# Patient Record
Sex: MALE | Race: WHITE | Employment: FULL TIME | URBAN - METROPOLITAN AREA
[De-identification: names, ages, dates, MRNs, and addresses within clinical notes are randomized per-mention and may not be internally consistent; named-entity substitution may affect disease eponyms.]

---

## 2017-02-15 ENCOUNTER — ALLIED HEALTH/NURSE VISIT (OUTPATIENT)
Dept: FAMILY MEDICINE | Facility: CLINIC | Age: 32
End: 2017-02-15

## 2017-02-15 DIAGNOSIS — Z23 NEED FOR VACCINATION: Primary | ICD-10-CM

## 2017-02-15 PROCEDURE — 90471 IMMUNIZATION ADMIN: CPT | Performed by: FAMILY MEDICINE

## 2017-02-15 PROCEDURE — 90715 TDAP VACCINE 7 YRS/> IM: CPT | Performed by: FAMILY MEDICINE

## 2017-02-15 NOTE — MR AVS SNAPSHOT
"              After Visit Summary   2/15/2017    Hung Bennett    MRN: [id number]           Patient Information     Date Of Birth          1985        Visit Information        Provider Department      2/15/2017 3:30 PM Mihai Gonsalez MD [facility]        Today's Diagnoses     Need for vaccination    -  1       Follow-ups after your visit        Who to contact     If you have questions or need follow up information about today's clinic visit or your schedule please contact [facility] directly at [phone].  Normal or non-critical lab and imaging results will be communicated to you by pickrsethart, letter or phone within 4 business days after the clinic has received the results. If you do not hear from us within 7 days, please contact the clinic through pickrsethart or phone. If you have a critical or abnormal lab result, we will notify you by phone as soon as possible.  Submit refill requests through Sensipass or call your pharmacy and they will forward the refill request to us. Please allow 3 business days for your refill to be completed.          Additional Information About Your Visit        MyChart Information     Sensipass lets you send messages to your doctor, view your test results, renew your prescriptions, schedule appointments and more. To sign up, go to [email or web address] . Click on \"Log in\" on the left side of the screen, which will take you to the Welcome page. Then click on \"Sign up Now\" on the right side of the page.     You will be asked to enter the access code listed below, as well as some personal information. Please follow the directions to create your username and password.     Your access code is: [email or web address]  Expires: 2017  3:57 PM     Your access code will  in 90 days. If you need help or a new code, please call your [place] clinic or [phone].        Care EveryWhere ID     This is your Care EveryWhere ID. This could " be used by other organizations to access your [place] medical records  [id number]         Blood Pressure from Last 3 Encounters:   02/04/15 108/70   12/23/14 108/70   11/08/02 90/60    Weight from Last 3 Encounters:   02/04/15 72.1 kg (159 lb)   12/23/14 72.1 kg (159 lb)   11/08/02 61.5 kg (135 lb 9.6 oz) (20 %)*     * Growth percentiles are based on [facility] 2-20 Years data.              We Performed the Following     TDAP VACCINE (BOOSTRIX AGES 10-64)     VACCINE ADMINISTRATION, INITIAL        Primary Care Provider Office Phone # Fax #    Mihai Gonsalez [phone]       [facility] [address] [facility] [address]  [facility] [address]        Thank you!     Thank you for choosing [facility]  for your care. Our goal is always to provide you with excellent care. Hearing back from our patients is one way we can continue to improve our services. Please take a few minutes to complete the written survey that you may receive in the mail after your visit with us. Thank you!             Your Updated Medication List - Protect others around you: Learn how to safely use, store and throw away your medicines at www.disposemymeds.org.      Notice  As of 2/15/2017  3:57 PM    You have not been prescribed any medications.

## 2017-09-12 ENCOUNTER — TELEPHONE (OUTPATIENT)
Dept: FAMILY MEDICINE | Facility: CLINIC | Age: 32
End: 2017-09-12

## 2017-09-12 NOTE — TELEPHONE ENCOUNTER
Medical Examination Reports dropped off to be filled out by Dr. Gonsalez.    I called the patient and informed him that he would need to be seen for a Physical in order for us to fill out these forms since he has not been seen since 2014 for any sort of CPX.    He will call and schedule appointment when he has his calender.    I will hold on to forms until his visit.    Adela Devries, CMA

## 2017-09-20 ENCOUNTER — OFFICE VISIT (OUTPATIENT)
Dept: FAMILY MEDICINE | Facility: CLINIC | Age: 32
End: 2017-09-20

## 2017-09-20 VITALS
WEIGHT: 163 LBS | HEIGHT: 67 IN | SYSTOLIC BLOOD PRESSURE: 92 MMHG | DIASTOLIC BLOOD PRESSURE: 66 MMHG | BODY MASS INDEX: 25.58 KG/M2 | HEART RATE: 70 BPM | OXYGEN SATURATION: 98 % | TEMPERATURE: 98.3 F

## 2017-09-20 DIAGNOSIS — Z71.89 ACP (ADVANCE CARE PLANNING): ICD-10-CM

## 2017-09-20 DIAGNOSIS — Z00.00 ROUTINE GENERAL MEDICAL EXAMINATION AT A HEALTH CARE FACILITY: Primary | ICD-10-CM

## 2017-09-20 PROBLEM — Z76.89 HEALTH CARE HOME: Status: ACTIVE | Noted: 2017-09-20

## 2017-09-20 PROCEDURE — 99395 PREV VISIT EST AGE 18-39: CPT | Performed by: FAMILY MEDICINE

## 2017-09-20 NOTE — MR AVS SNAPSHOT
After Visit Summary   9/20/2017    Hung Bennett    MRN: [id number]           Patient Information     Date Of Birth          1985        Visit Information        Provider Department      9/20/2017 2:00 PM Mihai Gonsalez MD [facility]        Today's Diagnoses     Routine general medical examination at a health care facility    -  1    ACP (advance care planning)           Follow-ups after your visit        Follow-up notes from your care team     Return in about 1 year (around 9/20/2018).      Your next 10 appointments already scheduled     Sep 21, 2017  8:00 AM CDT   Lab Appointment with [facility]/XRAY   [facility] ([facility])    [address]  [address]  [facility] [address] [phone]              Future tests that were ordered for you today     Open Standing Orders        Priority Remaining Interval Expires Ordered    Lipid Profile (QUEST) Routine 1/1  10/20/2017 9/20/2017    COMPREHENSIVE METABOLIC PANEL ([facility]) XCMP Routine 1/1  10/20/2017 9/20/2017    VENOUS COLLECTION Routine 1/1  10/20/2017 9/20/2017            Who to contact     If you have questions or need follow up information about today's clinic visit or your schedule please contact [facility] directly at [phone].  Normal or non-critical lab and imaging results will be communicated to you by MyChart, letter or phone within 4 business days after the clinic has received the results. If you do not hear from us within 7 days, please contact the clinic through MyChart or phone. If you have a critical or abnormal lab result, we will notify you by phone as soon as possible.  Submit refill requests through Routeware or call your pharmacy and they will forward the refill request to us. Please allow 3 business days for your refill to be completed.          Additional Information About Your Visit        MyChart Information   "   Toutiao lets you send messages to your doctor, view your test results, renew your prescriptions, schedule appointments and more. To sign up, go to [email or web address] . Click on \"Log in\" on the left side of the screen, which will take you to the Welcome page. Then click on \"Sign up Now\" on the right side of the page.     You will be asked to enter the access code listed below, as well as some personal information. Please follow the directions to create your username and password.     Your access code is: [email or web address]  Expires: 2017  2:29 PM     Your access code will  in 90 days. If you need help or a new code, please call your [place] clinic or [phone].        Care EveryWhere ID     This is your Care EveryWhere ID. This could be used by other organizations to access your [place] medical records  [id number]        Your Vitals Were     Pulse Temperature Height Pulse Oximetry BMI (Body Mass Index)       70 98.3  F (36.8  C) (Oral) 1.702 m (5' 7\") 98% 25.53 kg/m2        Blood Pressure from Last 3 Encounters:   17 92/66   02/04/15 108/70   14 108/70    Weight from Last 3 Encounters:   17 73.9 kg (163 lb)   02/04/15 72.1 kg (159 lb)   14 72.1 kg (159 lb)               Primary Care Provider Office Phone # Fax #    Mihai Gonsalez [phone]       [address] [address]        Equal Access to Services     [facility]: Hadii betsy Blue, waanatolyda chaz, qaybta martina burt. So [facility] [phone].    ATENCIÓN: Si habla español, tiene a chiang disposición servicios gratuitos de asistencia lingüística. Angela al [phone].    We comply with applicable federal civil rights laws and [place] laws. We do not discriminate on the basis of race, color, national origin, age, disability sex, sexual orientation or gender identity.            Thank you!     Thank you for " choosing [facility]  for your care. Our goal is always to provide you with excellent care. Hearing back from our patients is one way we can continue to improve our services. Please take a few minutes to complete the written survey that you may receive in the mail after your visit with us. Thank you!             Your Updated Medication List - Protect others around you: Learn how to safely use, store and throw away your medicines at www.disposemymeds.org.      Notice  As of 9/20/2017  2:29 PM    You have not been prescribed any medications.

## 2017-09-20 NOTE — PROGRESS NOTES
SUBJECTIVE:   CC: Hung Bennett is an 32 year old male who presents for preventative health visit.     Healthy Habits:    Do you get at least three servings of calcium containing foods daily (dairy, green leafy vegetables, etc.)? yes    Amount of exercise or daily activities, outside of work: 3 day(s) per week    Problems taking medications regularly No    Medication side effects: No    Have you had an eye exam in the past two years? yes    Do you see a dentist twice per year? yes    Do you have sleep apnea, excessive snoring or daytime drowsiness?no              Today's PHQ-2 Score: PHQ-2 ( 1999 Pfizer) 9/20/2017   Q1: Little interest or pleasure in doing things 0   Q2: Feeling down, depressed or hopeless 0   PHQ-2 Score 0         Abuse: Current or Past(Physical, Sexual or Emotional)- No  Do you feel safe in your environment - Yes  Social History   Substance Use Topics     Smoking status: Never Smoker     Smokeless tobacco: Never Used     Alcohol use Yes      Comment: 2-3 time a week (No more than 10 a week)     The patient does not drink >3 drinks per day nor >7 drinks per week.    Last PSA: No results found for: PSA    Reviewed orders with patient. Reviewed health maintenance and updated orders accordingly - Yes  BP Readings from Last 3 Encounters:   09/20/17 92/66   02/04/15 108/70   12/23/14 108/70    Wt Readings from Last 3 Encounters:   09/20/17 73.9 kg (163 lb)   02/04/15 72.1 kg (159 lb)   12/23/14 72.1 kg (159 lb)                  Patient Active Problem List   Diagnosis     ACP (advance care planning)     Health Care Home     Past Surgical History:   Procedure Laterality Date     NO HISTORY OF SURGERY         Social History   Substance Use Topics     Smoking status: Never Smoker     Smokeless tobacco: Never Used     Alcohol use Yes      Comment: 2-3 time a week (No more than 10 a week)     Family History   Problem Relation Age of Onset     Family History Negative Father      Family History Negative  "Mother      Family History Negative Brother      Family History Negative Sister      Coronary Artery Disease Paternal Grandfather      DIABETES No family hx of      Hypertension No family hx of      CEREBROVASCULAR DISEASE No family hx of      Colon Cancer No family hx of      Prostate Cancer No family hx of          No current outpatient prescriptions on file.     Allergies   Allergen Reactions     No Known Drug Allergies            Reviewed and updated as needed this visit by clinical staffTobacco  Allergies  Meds  Problems         Reviewed and updated as needed this visit by Provider        Past Medical History:   Diagnosis Date     NO ACTIVE PROBLEMS       Past Surgical History:   Procedure Laterality Date     NO HISTORY OF SURGERY         ROS:  C: NEGATIVE for fever, chills, change in weight  I: NEGATIVE for worrisome rashes, moles or lesions  E: NEGATIVE for vision changes or irritation  ENT: NEGATIVE for ear, mouth and throat problems  R: NEGATIVE for significant cough or SOB  CV: NEGATIVE for chest pain, palpitations or peripheral edema  GI: NEGATIVE for nausea, abdominal pain, heartburn, or change in bowel habits   male: negative for dysuria, hematuria, decreased urinary stream, erectile dysfunction, urethral discharge  M: NEGATIVE for significant arthralgias or myalgia  N: NEGATIVE for weakness, dizziness or paresthesias  P: NEGATIVE for changes in mood or affect    OBJECTIVE:   BP 92/66 (BP Location: Right arm, Patient Position: Chair, Cuff Size: Adult Large)  Pulse 70  Temp 98.3  F (36.8  C) (Oral)  Ht 1.702 m (5' 7\")  Wt 73.9 kg (163 lb)  SpO2 98%  BMI 25.53 kg/m2  EXAM:  GENERAL: healthy, alert and no distress  EYES: Eyes grossly normal to inspection, PERRL and conjunctivae and sclerae normal  HENT: ear canals and TM's normal, nose and mouth without ulcers or lesions  NECK: no adenopathy, no asymmetry, masses, or scars and thyroid normal to palpation  RESP: lungs clear to auscultation - no " "rales, rhonchi or wheezes  CV: regular rate and rhythm, normal S1 S2, no S3 or S4, no murmur, click or rub, no peripheral edema and peripheral pulses strong  ABDOMEN: soft, nontender, no hepatosplenomegaly, no masses and bowel sounds normal   (male): normal male genitalia without lesions or urethral discharge, no hernia  MS: no gross musculoskeletal defects noted, no edema  SKIN: no suspicious lesions or rashes  NEURO: Normal strength and tone, mentation intact and speech normal  PSYCH: mentation appears normal, affect normal/bright    ASSESSMENT/PLAN:   (Z00.00) Routine general medical examination at a health care facility  (primary encounter diagnosis)  Comment: discussed preventitive healthcare   Plan: Lipid Profile ([facility]), COMPREHENSIVE METABOLIC         PANEL (QUEST) XCMP, VENOUS COLLECTION        Continue to work on healthy diet and exercise, discussed healthy habits     (Z71.89) ACP (advance care planning)  Comment:   Plan:     COUNSELING:  Reviewed preventive health counseling, as reflected in patient instructions       Regular exercise       Healthy diet/nutrition       Vision screening           reports that he has never smoked. He has never used smokeless tobacco.      Estimated body mass index is 25.53 kg/(m^2) as calculated from the following:    Height as of this encounter: 1.702 m (5' 7\").    Weight as of this encounter: 73.9 kg (163 lb).   Weight management plan: Discussed healthy diet and exercise guidelines and patient will follow up in 12 months in clinic to re-evaluate.    Counseling Resources:  ATP IV Guidelines  Pooled Cohorts Equation Calculator  FRAX Risk Assessment  ICSI Preventive Guidelines  Dietary Guidelines for Americans, 2010  USDA's MyPlate  ASA Prophylaxis  Lung CA Screening    Mihai Gonsalez MD  [facility]  "

## 2017-09-20 NOTE — NURSING NOTE
"Hung Bennett is here for a CPX. Fasting: Not Today.    Pre-visit Planning  Immunizations -up to date  Colonoscopy -NA  Mammogram -NA  Asthma test --NA  PHQ2 -is completed today  SOPHIA 7 -NA  Fall Risk Assessment -NA    Vitals:  BP Cuff right  Arm with large Cuff  PULSE regular  163 lbs 0 oz and 5' 7\"  CLASSIFICATION OF OVERWEIGHT AND OBESITY BY BMI                        Obesity Class           BMI(kg/m2)  Underweight                                    < 18.5  Normal                                         18.5-24.9  Overweight                                     25.0-29.9  OBESITY                     I                  30.0-34.9                             II                 35.0-39.9  EXTREME OBESITY             III                >40      Patient's  BMI Body mass index is 25.53 kg/(m^2).  Http://hin.nhlbi.nih.gov/menuplanner/menu.cgi  My Chart: - accepts   Tobacco Use:  Questioned patient about current smoking habits.  Pt. Currently smokes on occasion.  ETOH screening Questions:  1-How often do you have a drink containing alcohol?                             2 times per week(s)  2-How many drinks containing alcohol do you have on a typical day when you are         Drinking?                              3 to 4   3- How often do you have 5 or more drinks on one occasion?                              2 times a month    Have you ever:  None of the patient's responses to the CAGE screening were positive / Negative CAGE score    Roomed by: Adela Devries CMA      "

## 2017-09-21 DIAGNOSIS — Z00.00 ROUTINE GENERAL MEDICAL EXAMINATION AT A HEALTH CARE FACILITY: ICD-10-CM

## 2017-09-21 PROCEDURE — 36415 COLL VENOUS BLD VENIPUNCTURE: CPT | Performed by: FAMILY MEDICINE

## 2017-09-21 PROCEDURE — 80061 LIPID PANEL: CPT | Mod: 90 | Performed by: FAMILY MEDICINE

## 2017-09-21 PROCEDURE — 80053 COMPREHEN METABOLIC PANEL: CPT | Mod: 90 | Performed by: FAMILY MEDICINE

## 2017-09-21 NOTE — LETTER
September 25, 2017      Hung Bennett  [address] [facility] [address]        Hung Bennett     The results of your recent Blood Sugar and Kidney Tests were within normal limits.     Your recent cholesterol panel shows mildly elevated readings.  At this point I would recommend lifestyle modifications to include regular exercise, as well as changes in your diet to include more fruits and vegetables with  fewer carbohydrates, especially processed carbohydrates (sugars,  bread, rice, pasta, chips etc.)  We can recheck your labs in 12 months or so to measure your progress.    Your AST liver test was elevated-most commonly this is due to alcohol consumption.  I would recommend you lower your consumption in this case.    The results are now released on My Chart. Please contact me if you have further questions or concerns.    Sincerely,    ZAINAB Gonsalez M.D.     Resulted Orders   Lipid Profile ([facility])   Result Value Ref Range    Cholesterol 220 (H) <200 mg/dL    HDL Cholesterol 58 >40 mg/dL    Triglycerides 92 <150 mg/dL    LDL Cholesterol Calculated 142 (H) mg/dL (calc)      Comment:      Reference range: <100     Desirable range <100 mg/dL for patients with CHD or  diabetes and <70 mg/dL for diabetic patients with  known heart disease.     LDL-C is now calculated using the Fermín-Womack   calculation, which is a validated novel method providing   better accuracy than the Friedewald equation in the   estimation of LDL-C.   Fermín SS et al. OVIDIO. 2013;310(19): 8225-0306   (http://education.Hyphen 8.Fixetude/faq/YHX251)      Cholesterol/HDL Ratio 3.8 <5.0 (calc)    Non HDL Cholesterol 162 (H) <130 mg/dL (calc)      Comment:      For patients with diabetes plus 1 major ASCVD risk   factor, treating to a non-HDL-C goal of <100 mg/dL   (LDL-C of <70 mg/dL) is considered a therapeutic   option.     COMPREHENSIVE METABOLIC PANEL (QUEST) XCMP   Result Value Ref Range    Glucose 96 65 - 99 mg/dL      Comment:                     Fasting reference interval         Urea Nitrogen 19 7 - 25 mg/dL    Creatinine 1.27 0.60 - 1.35 mg/dL    GFR Estimate 73 > OR = 60 mL/min/1.73m2    EGFR African American 85 > OR = 60 mL/min/1.73m2    BUN/Creatinine Ratio NOT APPLICABLE 6 - 22 (calc)    Sodium 139 135 - 146 mmol/L    Potassium 4.3 3.5 - 5.3 mmol/L    Chloride 103 98 - 110 mmol/L    Carbon Dioxide 24 20 - 31 mmol/L    Calcium 9.5 8.6 - 10.3 mg/dL    Protein Total 7.4 6.1 - 8.1 g/dL    Albumin 4.6 3.6 - 5.1 g/dL    Globulin Calculated 2.8 1.9 - 3.7 g/dL (calc)    A/G Ratio 1.6 1.0 - 2.5 (calc)    Bilirubin Total 0.7 0.2 - 1.2 mg/dL    Alkaline Phosphatase 61 40 - 115 U/L    AST 63 (H) 10 - 40 U/L    ALT 37 9 - 46 U/L       If you have any questions or concerns, please call the clinic at the number listed above.       Sincerely,        Mihai Gonsalez MD

## 2017-09-22 LAB
ALBUMIN SERPL-MCNC: 4.6 G/DL (ref 3.6–5.1)
ALBUMIN/GLOB SERPL: 1.6 (CALC) (ref 1–2.5)
ALP SERPL-CCNC: 61 U/L (ref 40–115)
ALT SERPL-CCNC: 37 U/L (ref 9–46)
AST SERPL-CCNC: 63 U/L (ref 10–40)
BILIRUB SERPL-MCNC: 0.7 MG/DL (ref 0.2–1.2)
BUN SERPL-MCNC: 19 MG/DL (ref 7–25)
BUN/CREATININE RATIO: ABNORMAL (CALC) (ref 6–22)
CALCIUM SERPL-MCNC: 9.5 MG/DL (ref 8.6–10.3)
CHLORIDE SERPLBLD-SCNC: 103 MMOL/L (ref 98–110)
CHOLEST SERPL-MCNC: 220 MG/DL
CHOLEST/HDLC SERPL: 3.8 (CALC)
CO2 SERPL-SCNC: 24 MMOL/L (ref 20–31)
CREAT SERPL-MCNC: 1.27 MG/DL (ref 0.6–1.35)
EGFR AFRICAN AMERICAN - QUEST: 85 ML/MIN/1.73M2
GFR SERPL CREATININE-BSD FRML MDRD: 73 ML/MIN/1.73M2
GLOBULIN, CALCULATED - QUEST: 2.8 G/DL (CALC) (ref 1.9–3.7)
GLUCOSE - QUEST: 96 MG/DL (ref 65–99)
HDLC SERPL-MCNC: 58 MG/DL
LDLC SERPL CALC-MCNC: 142 MG/DL (CALC)
NONHDLC SERPL-MCNC: 162 MG/DL (CALC)
POTASSIUM SERPL-SCNC: 4.3 MMOL/L (ref 3.5–5.3)
PROT SERPL-MCNC: 7.4 G/DL (ref 6.1–8.1)
SODIUM SERPL-SCNC: 139 MMOL/L (ref 135–146)
TRIGL SERPL-MCNC: 92 MG/DL

## 2017-10-03 ENCOUNTER — TELEPHONE (OUTPATIENT)
Dept: FAMILY MEDICINE | Facility: CLINIC | Age: 32
End: 2017-10-03

## 2017-10-03 NOTE — TELEPHONE ENCOUNTER
Pt had elevated AST-discussed this is likely from alcohol- has was drinking one night prior to tests- explained this is likely something that will normalize with less ETOH

## 2017-10-03 NOTE — TELEPHONE ENCOUNTER
Hung Marrero called the CS line today.     He is very concerned about his AST level and would like to have more information about this and how high is this.     He would like to have a call back from you if you can.     Please advise.     He can be reached at: [phone]    KEATNO Posey ([facility])

## 2019-01-24 ENCOUNTER — OFFICE VISIT (OUTPATIENT)
Dept: FAMILY MEDICINE | Facility: CLINIC | Age: 34
End: 2019-01-24

## 2019-01-24 VITALS
WEIGHT: 155 LBS | BODY MASS INDEX: 24.33 KG/M2 | HEART RATE: 60 BPM | HEIGHT: 67 IN | DIASTOLIC BLOOD PRESSURE: 62 MMHG | RESPIRATION RATE: 20 BRPM | TEMPERATURE: 97.2 F | SYSTOLIC BLOOD PRESSURE: 112 MMHG

## 2019-01-24 DIAGNOSIS — Z13.220 SCREENING FOR LIPID DISORDERS: ICD-10-CM

## 2019-01-24 DIAGNOSIS — Z00.00 ENCOUNTER FOR GENERAL ADULT MEDICAL EXAMINATION W/O ABNORMAL FINDINGS: Primary | ICD-10-CM

## 2019-01-24 DIAGNOSIS — Z13.228 SCREENING FOR METABOLIC DISORDER: ICD-10-CM

## 2019-01-24 PROCEDURE — 99395 PREV VISIT EST AGE 18-39: CPT | Mod: 25 | Performed by: PHYSICIAN ASSISTANT

## 2019-01-24 PROCEDURE — 80061 LIPID PANEL: CPT | Mod: 90 | Performed by: PHYSICIAN ASSISTANT

## 2019-01-24 PROCEDURE — 36415 COLL VENOUS BLD VENIPUNCTURE: CPT | Performed by: PHYSICIAN ASSISTANT

## 2019-01-24 PROCEDURE — 90471 IMMUNIZATION ADMIN: CPT | Performed by: PHYSICIAN ASSISTANT

## 2019-01-24 PROCEDURE — 90686 IIV4 VACC NO PRSV 0.5 ML IM: CPT | Performed by: PHYSICIAN ASSISTANT

## 2019-01-24 PROCEDURE — 80053 COMPREHEN METABOLIC PANEL: CPT | Mod: 90 | Performed by: PHYSICIAN ASSISTANT

## 2019-01-24 SDOH — HEALTH STABILITY: MENTAL HEALTH: HOW OFTEN DO YOU HAVE A DRINK CONTAINING ALCOHOL?: 2-3 TIMES A WEEK

## 2019-01-24 SDOH — HEALTH STABILITY: MENTAL HEALTH: HOW MANY STANDARD DRINKS CONTAINING ALCOHOL DO YOU HAVE ON A TYPICAL DAY?: 1 OR 2

## 2019-01-24 ASSESSMENT — MIFFLIN-ST. JEOR: SCORE: 1596.71

## 2019-01-24 NOTE — PROGRESS NOTES
Hung Bennett is a 33 year old male presents for routine health maintenance.    Current concerns: None. May need form filled out affirming good health for upcoming adoption.      Body mass index is 24.28 kg/m .    Present exercise habits:  1-3 times/week  Present dietary habits:  eats regular meals and follows a balanced nutrition diet    Vit D intake: is taking supplement    Is the patient a smoker? No  If yes, smoking cessation advised and counseling provided.     Cardiovascular risk factors: none    Over the past few weeks, have you felt down or depressed? Little interest or pleasure in doing things? No    Last dental appointment:  this year  Last optical appointment:  this year    Was the patient born between [birth year] and has not had Hep C testing?  No, not applicable    I have reviewed the following histories: Past Medical History, Past Surgical History, Social History, Family History, Problem List, Medication List and Allergies    Past Medical History:   Diagnosis Date     NO ACTIVE PROBLEMS      Family History   Problem Relation Age of Onset     Family History Negative Father      Family History Negative Mother      Family History Negative Brother      Family History Negative Sister      Coronary Artery Disease Paternal Grandfather      Diabetes No family hx of      Hypertension No family hx of      Cerebrovascular Disease No family hx of      Colon Cancer No family hx of      Prostate Cancer No family hx of      Social History     Socioeconomic History     Marital status: Single     Spouse name: Not on file     Number of children: 1     Years of education: Not on file     Highest education level: Not on file   Social Needs     Financial resource strain: Not on file     Food insecurity - worry: Not on file     Food insecurity - inability: Not on file     Transportation needs - medical: Not on file     Transportation needs - non-medical: Not on file   Occupational History     Occupation: construction  "manager   Tobacco Use     Smoking status: Never Smoker     Smokeless tobacco: Never Used   Substance and Sexual Activity     Alcohol use: Yes     Frequency: 2-3 times a week     Drinks per session: 1 or 2     Comment: 2-3 time a week (No more than 10 a week)     Drug use: No     Sexual activity: Yes     Partners: Female   Other Topics Concern     Not on file   Social History Narrative     Not on file     Patient Active Problem List   Diagnosis     ACP (advance care planning)     Health Care Home     No current outpatient medications on file.     No current facility-administered medications for this visit.        Allergies:    Allergies   Allergen Reactions     No Known Drug Allergies          ROS:  E/M: NEGATIVE for ear, nose, mouth and throat problems  R: NEGATIVE for significant/chronic cough or SOB  CV: NEGATIVE for chest pain or palpitations  GI: NEGATIVE for abdominal pain, chronic diarrhea or constipation  : NEGATIVE for dysuria, hematuria, weakened urinary stream        OBJECTIVE:    Vitals:    01/24/19 0940   BP: 112/62   BP Location: Left arm   Patient Position: Chair   Cuff Size: Adult Regular   Pulse: 60   Resp: 20   Temp: 97.2  F (36.2  C)   TempSrc: Oral   Weight: 70.3 kg (155 lb)   Height: 1.702 m (5' 7\")       General: 33 year old male who appears his stated age. Vital signs noted.  Head: Normocephalic  Eyes: pupils equal round reactive to light and accomodation, extra ocular movements intact  Ears: external canals and tms free of abnormalities  Nose: patent, without mucosal abnormalities  Mouth and throat: without erythema or lesions of the mucosa  Neck: supple, without adenopathy or thyromegaly  Lungs: clear to auscultation, no wheezing or crackles  Cv: regular rate and rhythm, normal s1 and s2 without murmur or click  Abd: soft, non-tender, no masses, no hepatomegaly or splenomegaly.  Gu: normal external genitalia, no hernia  Ms: normal muscle tone & symmetry  Skin: Clear to inspection  Neuro: " "sensation and motor function grossly intact; cranial nerves without obvious abnormalities.        ASSESSMENT/PLAN:    1. Encounter for general adult medical examination w/o abnormal findings  Hung is doing well today. Will update fasting labs, and send MyChart with results when available. Will likely be able to complete form he mentioned based on this exam, but will need to look at it first. He will work on getting this to me.      2. Screening for metabolic disorder  - Comprehensive metabolic panel  - VENOUS COLLECTION    3. Screening for lipid disorders  - Lipid Profile ([facility])  - VENOUS COLLECTION     reports that  has never smoked. he has never used smokeless tobacco.      Estimated body mass index is 24.28 kg/m  as calculated from the following:    Height as of this encounter: 1.702 m (5' 7\").    Weight as of this encounter: 70.3 kg (155 lb).        Labs pending:      Fasting glucose      Fasting lipids  Meds Suggested:      Vitamin D       Calcium  Tests Recommended:      Regular Dental Examinations        Eye exam  Behavior Modifications:       Cardiovascular exercise 3 times per week--enough to get your Target Heart rate  Other recommendations:     BMI noted and discussed      Regular testicle exam     Encouraged My Chart    The patient will return to the clinic if symptoms are changing or concern with follow up as discussed. The patient understands and agrees with the plan.      Arin Schroeder PA-C  1/24/2019          Counseling Resources:  ATP IV Guidelines  Pooled Cohorts Equation Calculator  Breast Cancer Risk Calculator  FRAX Risk Assessment  ICSI Preventive Guidelines  Dietary Guidelines for Americans, 2010  USDA's MyPlate    "

## 2019-01-24 NOTE — NURSING NOTE
Hung Bennett is here for a CPX.    Pre-visit planning  Immunizations -up to date  Colonoscopy -na  Mammogram -na  Asthma test --  PHQ9 -  SOPHIA 7 -    Questioned patient about current smoking habits.  Pt. has never smoked.  Body mass index is 24.28 kg/m .  PULSE regular  My Chart: active  CLASSIFICATION OF OVERWEIGHT AND OBESITY BY BMI                        Obesity Class           BMI(kg/m2)  Underweight                                    < 18.5  Normal                                         18.5-24.9  Overweight                                     25.0-29.9  OBESITY                     I                  30.0-34.9                             II                 35.0-39.9  EXTREME OBESITY             III                >40                            Patient's  BMI Body mass index is 24.28 kg/m .  Http://hin.nhlbi.nih.gov/menuplanner/menu.cgi

## 2019-01-25 LAB
ALBUMIN SERPL-MCNC: 4.7 G/DL (ref 3.6–5.1)
ALBUMIN/GLOB SERPL: 1.6 (CALC) (ref 1–2.5)
ALP SERPL-CCNC: 62 U/L (ref 40–115)
ALT SERPL-CCNC: 20 U/L (ref 9–46)
AST SERPL-CCNC: 18 U/L (ref 10–40)
BILIRUB SERPL-MCNC: 0.7 MG/DL (ref 0.2–1.2)
BUN SERPL-MCNC: 20 MG/DL (ref 7–25)
BUN/CREATININE RATIO: NORMAL (CALC) (ref 6–22)
CALCIUM SERPL-MCNC: 9.6 MG/DL (ref 8.6–10.3)
CHLORIDE SERPLBLD-SCNC: 105 MMOL/L (ref 98–110)
CHOLEST SERPL-MCNC: 235 MG/DL
CHOLEST/HDLC SERPL: 3.7 (CALC)
CO2 SERPL-SCNC: 27 MMOL/L (ref 20–32)
CREAT SERPL-MCNC: 1.13 MG/DL (ref 0.6–1.35)
EGFR AFRICAN AMERICAN - QUEST: 97 ML/MIN/1.73M2
GFR SERPL CREATININE-BSD FRML MDRD: 84 ML/MIN/1.73M2
GLOBULIN, CALCULATED - QUEST: 3 G/DL (CALC) (ref 1.9–3.7)
GLUCOSE - QUEST: 94 MG/DL (ref 65–99)
HDLC SERPL-MCNC: 64 MG/DL
LDLC SERPL CALC-MCNC: 151 MG/DL (CALC)
NONHDLC SERPL-MCNC: 171 MG/DL (CALC)
POTASSIUM SERPL-SCNC: 4.5 MMOL/L (ref 3.5–5.3)
PROT SERPL-MCNC: 7.7 G/DL (ref 6.1–8.1)
SODIUM SERPL-SCNC: 140 MMOL/L (ref 135–146)
TRIGL SERPL-MCNC: 92 MG/DL

## 2019-02-06 ENCOUNTER — MYC MEDICAL ADVICE (OUTPATIENT)
Dept: FAMILY MEDICINE | Facility: CLINIC | Age: 34
End: 2019-02-06

## 2019-02-06 NOTE — TELEPHONE ENCOUNTER
From: Hung Stevens  To: Arin Schroeder PA-C  Sent: 2/6/2019 8:55 AM CST  Subject: Question about a form I received    Hi Dr Schroeder,    As we talked about during my visit, there's a Medical Update Form we (Norma and I) need filled out for our Adoption Service. Attached is a copy of the form. Will you please ask someone to fill one of these out for Norma and one for me? Then they can either be scanned and sent back to me via [employer] or faxed to Kristy Lopez at [facility] at [phone].    Thanks,    Hung Stevens  [phone]

## 2019-02-08 NOTE — TELEPHONE ENCOUNTER
Completed form. Please fax, copy to scan into chart, and send to pt. Please also inform pt this was done.

## 2019-11-03 ENCOUNTER — HEALTH MAINTENANCE LETTER (OUTPATIENT)
Age: 34
End: 2019-11-03

## 2020-02-24 ENCOUNTER — OFFICE VISIT (OUTPATIENT)
Dept: FAMILY MEDICINE | Facility: CLINIC | Age: 35
End: 2020-02-24

## 2020-02-24 VITALS
WEIGHT: 170 LBS | BODY MASS INDEX: 26.68 KG/M2 | OXYGEN SATURATION: 98 % | HEIGHT: 67 IN | DIASTOLIC BLOOD PRESSURE: 64 MMHG | SYSTOLIC BLOOD PRESSURE: 118 MMHG | RESPIRATION RATE: 16 BRPM | HEART RATE: 72 BPM | TEMPERATURE: 97.7 F

## 2020-02-24 DIAGNOSIS — J06.9 UPPER RESPIRATORY TRACT INFECTION, UNSPECIFIED TYPE: ICD-10-CM

## 2020-02-24 DIAGNOSIS — J03.90 TONSILLITIS: Primary | ICD-10-CM

## 2020-02-24 LAB — S PYO AG THROAT QL IA.RAPID: NORMAL

## 2020-02-24 PROCEDURE — 87070 CULTURE OTHR SPECIMN AEROBIC: CPT | Performed by: FAMILY MEDICINE

## 2020-02-24 PROCEDURE — 87880 STREP A ASSAY W/OPTIC: CPT | Performed by: FAMILY MEDICINE

## 2020-02-24 PROCEDURE — 99213 OFFICE O/P EST LOW 20 MIN: CPT | Performed by: FAMILY MEDICINE

## 2020-02-24 RX ORDER — AZITHROMYCIN 250 MG/1
TABLET, FILM COATED ORAL
Qty: 6 TABLET | Refills: 0 | Status: SHIPPED | OUTPATIENT
Start: 2020-02-24 | End: 2020-02-29

## 2020-02-24 ASSESSMENT — MIFFLIN-ST. JEOR: SCORE: 1659.74

## 2020-02-24 NOTE — NURSING NOTE
Hung is here for sore throat for 3 days, worse at night, body aches, worse on the right side of his throat.          Pre-visit Screening:  Immunizations:  up to date  Colonoscopy:  NA  Mammogram: NA  Asthma Action Test/Plan:  NA  PHQ9:  None  GAD7:  None  Questioned patient about current smoking habits Pt. has never smoked.  Ok to leave detailed message on voice mail for today's visit only Yes, phone # cell

## 2020-02-24 NOTE — PROGRESS NOTES
"SUBJECTIVE:  Hung Bennett is an 34 year old male who presents for evaluation and treatment   of sore throat. Symptoms include sore throat and swollen glands. Onset 3   days, gradually worsening since that time. Known Strep exposure:   household exposure.    No current outpatient medications on file.     No current facility-administered medications for this visit.      Allergies   Allergen Reactions     No Known Drug Allergies        Social History     Tobacco Use     Smoking status: Never Smoker     Smokeless tobacco: Never Used   Substance Use Topics     Alcohol use: Yes     Frequency: 2-3 times a week     Drinks per session: 1 or 2     Comment: 2-3 time a week (No more than 10 a week)       OBJECTIVE:  /64 (BP Location: Right arm, Patient Position: Sitting, Cuff Size: Adult Large)   Pulse 72   Temp 97.7  F (36.5  C) (Oral)   Ht 1.702 m (5' 7\")   Wt 77.1 kg (170 lb)   SpO2 98%   BMI 26.63 kg/m    General appearance: healthy, alert, no distress, cooperative and smiling  Ears: R TM - normal: no effusions, no erythema, and normal landmarks, L TM - normal: no effusions, no erythema, and normal landmarks  Nose: clear discharge and mucosal edema  Oropharynx: moderate erythema, tonsillar hypertrophy, asymmetric 1+ right, exudates present, throat culture taken, rapid strep done and post nasal drainage  Neck: normal, supple and no adenopathy  Lungs: normal and clear to auscultation  Heart: regular rate and rhythm and no murmurs, clicks, or gallops    ASSESSMENT:  Acute pharyngitis - r/o Strep      RSS negative; await throat culture results.    Dx:  Non-strep pharyngitis/ Tonsillitis  URI    Rx:  1)  Symptomatic treatment with fluids, vaporizer, acetaminophen.  Symptomatic care with decongestants, fluids, tylenol/advil prn. Use GUAIFENESIN  MG OR TBCR, 1 tab po BID (Twice per day), D: 20, R: 0 for congestion and cough.      2)  Recheck as needed for persistence, worsening, appearance of new "   symptoms.  3)  Azithromycin    PLAN:  Discussed possible etiologies of symptoms and need for antibiotic   treatment if Strep found.

## 2020-02-24 NOTE — PATIENT INSTRUCTIONS
Await culture    1)  Symptomatic treatment with fluids, vaporizer, acetaminophen.  Symptomatic care with decongestants, fluids, tylenol/advil prn. Use GUAIFENESIN  MG OR TBCR, 1 tab po BID (Twice per day), D: 20, R: 0 for congestion and cough.      2)  Recheck as needed for persistence, worsening, appearance of new   symptoms.  3)  Azithromycin

## 2020-02-26 LAB — THROAT CULTURE: NORMAL

## 2020-11-16 ENCOUNTER — HEALTH MAINTENANCE LETTER (OUTPATIENT)
Age: 35
End: 2020-11-16

## 2021-09-18 ENCOUNTER — HEALTH MAINTENANCE LETTER (OUTPATIENT)
Age: 36
End: 2021-09-18

## 2022-01-08 ENCOUNTER — HEALTH MAINTENANCE LETTER (OUTPATIENT)
Age: 37
End: 2022-01-08

## 2022-11-19 ENCOUNTER — HEALTH MAINTENANCE LETTER (OUTPATIENT)
Age: 37
End: 2022-11-19

## 2023-04-09 ENCOUNTER — HEALTH MAINTENANCE LETTER (OUTPATIENT)
Age: 38
End: 2023-04-09

## 2024-06-17 PROBLEM — Z76.89 HEALTH CARE HOME: Status: RESOLVED | Noted: 2017-09-20 | Resolved: 2024-06-17
